# Patient Record
Sex: MALE | Race: BLACK OR AFRICAN AMERICAN | NOT HISPANIC OR LATINO | Employment: PART TIME | URBAN - METROPOLITAN AREA
[De-identification: names, ages, dates, MRNs, and addresses within clinical notes are randomized per-mention and may not be internally consistent; named-entity substitution may affect disease eponyms.]

---

## 2020-11-02 ENCOUNTER — TELEPHONE (OUTPATIENT)
Dept: FAMILY MEDICINE CLINIC | Facility: CLINIC | Age: 35
End: 2020-11-02

## 2020-11-02 ENCOUNTER — OFFICE VISIT (OUTPATIENT)
Dept: FAMILY MEDICINE CLINIC | Facility: CLINIC | Age: 35
End: 2020-11-02

## 2020-11-02 VITALS
WEIGHT: 194.6 LBS | TEMPERATURE: 97.4 F | HEIGHT: 69 IN | SYSTOLIC BLOOD PRESSURE: 110 MMHG | RESPIRATION RATE: 18 BRPM | OXYGEN SATURATION: 95 % | BODY MASS INDEX: 28.82 KG/M2 | HEART RATE: 61 BPM | DIASTOLIC BLOOD PRESSURE: 70 MMHG

## 2020-11-02 DIAGNOSIS — R21 RASH AND NONSPECIFIC SKIN ERUPTION: ICD-10-CM

## 2020-11-02 DIAGNOSIS — J45.30 MILD PERSISTENT ASTHMA, UNSPECIFIED WHETHER COMPLICATED: Primary | ICD-10-CM

## 2020-11-02 DIAGNOSIS — K21.9 GASTROESOPHAGEAL REFLUX DISEASE, UNSPECIFIED WHETHER ESOPHAGITIS PRESENT: ICD-10-CM

## 2020-11-02 DIAGNOSIS — F17.200 SMOKING: ICD-10-CM

## 2020-11-02 PROCEDURE — 99203 OFFICE O/P NEW LOW 30 MIN: CPT | Performed by: NURSE PRACTITIONER

## 2020-11-02 PROCEDURE — 99406 BEHAV CHNG SMOKING 3-10 MIN: CPT | Performed by: NURSE PRACTITIONER

## 2020-11-02 RX ORDER — ALBUTEROL SULFATE 90 UG/1
2 AEROSOL, METERED RESPIRATORY (INHALATION) EVERY 4 HOURS PRN
Qty: 6.7 G | Refills: 11 | Status: SHIPPED | OUTPATIENT
Start: 2020-11-02

## 2020-11-02 NOTE — PATIENT INSTRUCTIONS
Food Choices for Gastroesophageal Reflux Disease, Adult  When you have gastroesophageal reflux disease (GERD), the foods you eat and your eating habits are very important. Choosing the right foods can help ease your discomfort. Think about working with a nutrition specialist (dietitian) to help you make good choices.  What are tips for following this plan?    Meals  · Choose healthy foods that are low in fat, such as fruits, vegetables, whole grains, low-fat dairy products, and lean meat, fish, and poultry.  · Eat small meals often instead of 3 large meals a day. Eat your meals slowly, and in a place where you are relaxed. Avoid bending over or lying down until 2-3 hours after eating.  · Avoid eating meals 2-3 hours before bed.  · Avoid drinking a lot of liquid with meals.  · Cook foods using methods other than frying. Bake, grill, or broil food instead.  · Avoid or limit:  ? Chocolate.  ? Peppermint or spearmint.  ? Alcohol.  ? Pepper.  ? Black and decaffeinated coffee.  ? Black and decaffeinated tea.  ? Bubbly (carbonated) soft drinks.  ? Caffeinated energy drinks and soft drinks.  · Limit high-fat foods such as:  ? Fatty meat or fried foods.  ? Whole milk, cream, butter, or ice cream.  ? Nuts and nut butters.  ? Pastries, donuts, and sweets made with butter or shortening.  · Avoid foods that cause symptoms. These foods may be different for everyone. Common foods that cause symptoms include:  ? Tomatoes.  ? Oranges, laurita, and limes.  ? Peppers.  ? Spicy food.  ? Onions and garlic.  ? Vinegar.  Lifestyle  · Maintain a healthy weight. Ask your doctor what weight is healthy for you. If you need to lose weight, work with your doctor to do so safely.  · Exercise for at least 30 minutes for 5 or more days each week, or as told by your doctor.  · Wear loose-fitting clothes.  · Do not smoke. If you need help quitting, ask your doctor.  · Sleep with the head of your bed higher than your feet. Use a wedge under the  mattress or blocks under the bed frame to raise the head of the bed.  Summary  · When you have gastroesophageal reflux disease (GERD), food and lifestyle choices are very important in easing your symptoms.  · Eat small meals often instead of 3 large meals a day. Eat your meals slowly, and in a place where you are relaxed.  · Limit high-fat foods such as fatty meat or fried foods.  · Avoid bending over or lying down until 2-3 hours after eating.  · Avoid peppermint and spearmint, caffeine, alcohol, and chocolate.  This information is not intended to replace advice given to you by your health care provider. Make sure you discuss any questions you have with your health care provider.  Document Released: 06/18/2013 Document Revised: 04/09/2020 Document Reviewed: 01/23/2018  Elsevier Patient Education © 2020 [employer]    Famotidine (Pepcid) 20mg twice a day.

## 2020-11-02 NOTE — TELEPHONE ENCOUNTER
PHARMACY CALLED STATED THAT RX   beclomethasone (Qvar) 40 MCG/ACT inhaler     IS NO LONGER MADE AND NOT INTERCHANGABLE,. REQUEST NEW RX OR CAN CALL IT IN.    PLEASE ADVISE.  CALL BACK:[phone]    [id number] - [place], [place] - [address]  AT [facility]

## 2020-11-02 NOTE — PROGRESS NOTES
Jonny Matias presents today to establish care.    Establish Care and Rash (neck (last week))       HPI   He presents today to establish care and d/t recent rash and worsening issues with asthma.  He has never had a rash like this previously.  It started last Wednesday around his neck.  It went up on the back of his ears also.  The skin around his neck was swollen.  He denies swelling of his throat or difficulty breathing.  He took Benadryl.  He felt like this helped it to get better.  He tried a sauce while at work and feels like that could have started it.  He thinks it might have had fish in it.  It ended up getting better after 3 days.  He has never had this happen before.      He has had asthma all of his life.  2 weeks ago it started flaring again, especially at night.  His asthma does this yearly around this time.  He has SOB, wheezing.  He has been using his girlfriends inhaler.  It does help when he uses the rescue inhaler.  He used a different inhaler in the past, but doesn't recall what it is.  He does smoke 3-4 Black and Mild cigars per day.  He feels like he only smokes these when he is having a drink.    His acid reflux has been bothering him at night also.  He has been having to take a couple of Tums before bedtime for the past couple of weeks.  He will have the acid in his throat, bitter taste, and burning.  He denies any vomiting from this.  The Tums does help this to get better.  He notices that certain foods cause the acid to occur.      Review of Systems   Constitutional: Negative.    HENT: Negative.    Eyes: Positive for visual disturbance ( wears glasses).   Respiratory: Positive for cough, shortness of breath and wheezing.    Cardiovascular: Negative.    Gastrointestinal: Positive for indigestion. Negative for constipation, diarrhea, nausea and vomiting.   Endocrine: Negative.    Genitourinary: Negative.    Musculoskeletal: Negative.    Skin: Positive for rash.   Allergic/Immunologic:  "Negative.    Neurological: Negative.    Hematological: Negative.    Psychiatric/Behavioral: Negative.         Past Medical History:   Diagnosis Date   • Acid reflux    • Asthma         History reviewed. No pertinent surgical history.     History reviewed. No pertinent family history.     Social History     Socioeconomic History   • Marital status: Single     Spouse name: Not on file   • Number of children: Not on file   • Years of education: Not on file   • Highest education level: Not on file   Tobacco Use   • Smoking status: Current Every Day Smoker     Packs/day: 4.00     Types: Cigarettes   • Smokeless tobacco: Never Used   Substance and Sexual Activity   • Alcohol use: Yes     Comment: occ   • Drug use: Never   • Sexual activity: Yes        No current outpatient medications on file prior to visit.     No current facility-administered medications on file prior to visit.        No Known Allergies     Vitals:    11/02/20 1324   BP: 110/70   Pulse: 61   Resp: 18   Temp: 97.4 °F (36.3 °C)   SpO2: 95%   Weight: 88.3 kg (194 lb 9.6 oz)   Height: 176.5 cm (69.49\")        Physical Exam  Vitals signs reviewed.   Constitutional:       Appearance: Normal appearance.   HENT:      Head: Normocephalic and atraumatic.      Right Ear: Tympanic membrane, ear canal and external ear normal.      Left Ear: Tympanic membrane, ear canal and external ear normal.      Nose: Nose normal.      Mouth/Throat:      Mouth: Mucous membranes are moist.      Pharynx: Oropharynx is clear.   Eyes:      Pupils: Pupils are equal, round, and reactive to light.   Neck:      Musculoskeletal: Neck supple.   Cardiovascular:      Rate and Rhythm: Normal rate and regular rhythm.      Pulses: Normal pulses.      Heart sounds: Normal heart sounds.   Pulmonary:      Effort: Pulmonary effort is normal.      Breath sounds: Normal breath sounds.   Abdominal:      General: Abdomen is flat. Bowel sounds are normal.      Palpations: Abdomen is soft. "   Lymphadenopathy:      Cervical: No cervical adenopathy.   Skin:     General: Skin is warm and dry.      Findings: No rash ( resolved by the time of the visit).   Neurological:      General: No focal deficit present.      Mental Status: He is alert and oriented to person, place, and time.   Psychiatric:         Mood and Affect: Mood normal.         Behavior: Behavior normal.         Thought Content: Thought content normal.         Judgment: Judgment normal.        Diagnoses and all orders for this visit:    1. Mild persistent asthma, unspecified whether complicated (Primary) -was not having any issues until 2 weeks ago.  For the past several nights it has been bothering him.  No wheezing noted upon exam.  O2 sat normal.  No respiratory distress.  Will start on maintenance asthma inhaler.  Pt also doesn't have Albuterol inhaler, will fill this also.  If symptoms worsen or do not improve, RTC.  -     albuterol sulfate  (90 Base) MCG/ACT inhaler; Inhale 2 puffs Every 4 (Four) Hours As Needed for Wheezing.  Dispense: 6.7 g; Refill: 11  -     CBC & Differential; Future  -     Comprehensive Metabolic Panel; Future  -     Hepatitis C Antibody; Future  -     Lipid Panel; Future  -     TSH Rfx On Abnormal To Free T4; Future  -     Hemoglobin A1c; Future  -     beclomethasone (Qvar) 80 MCG/ACT inhaler; Inhale 1 puff 2 (Two) Times a Day.  Dispense: 8.7 g; Refill: 2  Rinse mouth out after use.    2. Rash and nonspecific skin eruption -resolved by time of visit.  -     CBC & Differential; Future  -     Comprehensive Metabolic Panel; Future  -     Hepatitis C Antibody; Future  -     Lipid Panel; Future  -     TSH Rfx On Abnormal To Free T4; Future  -     Hemoglobin A1c; Future    3. Gastroesophageal reflux disease, unspecified whether esophagitis present -occurring daily.  Provided pt with educational material on lifestyle changes to improve his symptoms.  Also encouraged him to try OTC Famotidine 20mg PO BID.  If  symptoms do not improve, will consider PPI.    4. Smoking -pt would like to quit smoking.  Discussed methods to help him stop smoking.  He will try behavioral changes first.  If this is not helpful, he will consider medication assistance.  3-5 minute discussion on smoking cessation.    Return in about 6 weeks (around 12/14/2020) for Annual physical.    Stephanie Cody, APRN

## 2020-11-03 ENCOUNTER — TELEPHONE (OUTPATIENT)
Dept: FAMILY MEDICINE CLINIC | Facility: CLINIC | Age: 35
End: 2020-11-03

## 2020-11-03 NOTE — TELEPHONE ENCOUNTER
Pharmacy Name:  [id number] [place], [place]    Pharmacy representative name: KARIN    Pharmacy representative phone number: [phone]    What medication are you calling in regards to: beclomethasone (Qvar) 80 MCG/ACT inhaler    What question does the pharmacy have: KARIN ASKED IF THEY COULD USE THE READIHALER AND IF SO WHICH STRENGTH.    Who is the provider that prescribed the medication: JEFFERSON PETERSON    Additional notes: N/A

## 2020-11-03 NOTE — TELEPHONE ENCOUNTER
Would you like the patient to try the alternative suggested by the pharmacist or stick with the albuterol inhaler?